# Patient Record
Sex: FEMALE | URBAN - METROPOLITAN AREA
[De-identification: names, ages, dates, MRNs, and addresses within clinical notes are randomized per-mention and may not be internally consistent; named-entity substitution may affect disease eponyms.]

---

## 2021-11-15 ENCOUNTER — APPOINTMENT (OUTPATIENT)
Dept: URBAN - METROPOLITAN AREA CLINIC 253 | Age: 46
Setting detail: DERMATOLOGY
End: 2021-11-18

## 2021-11-15 VITALS — RESPIRATION RATE: 16 BRPM | WEIGHT: 158 LBS | HEIGHT: 66 IN

## 2021-11-15 DIAGNOSIS — L20.89 OTHER ATOPIC DERMATITIS: ICD-10-CM

## 2021-11-15 PROCEDURE — OTHER ADDITIONAL NOTES: OTHER

## 2021-11-15 PROCEDURE — 99203 OFFICE O/P NEW LOW 30 MIN: CPT

## 2021-11-15 PROCEDURE — OTHER PRESCRIPTION: OTHER

## 2021-11-15 PROCEDURE — OTHER COUNSELING: OTHER

## 2021-11-15 PROCEDURE — OTHER MIPS QUALITY: OTHER

## 2021-11-15 RX ORDER — FLUOCINONIDE 0.5 MG/ML
0.05% SOLUTION TOPICAL BID
Qty: 60 | Refills: 1 | Status: ERX | COMMUNITY
Start: 2021-11-15

## 2021-11-15 RX ORDER — HYDROXYZINE HYDROCHLORIDE 25 MG/1
25 MG TABLET, FILM COATED ORAL QHS
Qty: 60 | Refills: 1 | Status: ERX | COMMUNITY
Start: 2021-11-15

## 2021-11-15 RX ORDER — FLUOCINONIDE 0.5 MG/G
0.05% OINTMENT TOPICAL BID
Qty: 30 | Refills: 1 | Status: ERX | COMMUNITY
Start: 2021-11-15

## 2021-11-15 ASSESSMENT — BSA ECZEMA: % BODY COVERED IN ECZEMA: 50

## 2021-11-15 ASSESSMENT — LOCATION ZONE DERM: LOCATION ZONE: TRUNK

## 2021-11-15 ASSESSMENT — LOCATION DETAILED DESCRIPTION DERM: LOCATION DETAILED: LEFT INFERIOR MEDIAL UPPER BACK

## 2021-11-15 ASSESSMENT — LOCATION SIMPLE DESCRIPTION DERM: LOCATION SIMPLE: LEFT UPPER BACK

## 2021-11-15 NOTE — PROCEDURE: ADDITIONAL NOTES
Render Risk Assessment In Note?: no
Detail Level: Zone
Additional Notes: Briefly discussed dupixent. Will revisit if needed at the beginning of next year when she gets insurance.

## 2021-12-09 ENCOUNTER — APPOINTMENT (OUTPATIENT)
Dept: URBAN - METROPOLITAN AREA CLINIC 253 | Age: 46
Setting detail: DERMATOLOGY
End: 2021-12-13

## 2021-12-09 VITALS — HEIGHT: 66 IN | WEIGHT: 158 LBS | RESPIRATION RATE: 14 BRPM

## 2021-12-09 DIAGNOSIS — L20.89 OTHER ATOPIC DERMATITIS: ICD-10-CM

## 2021-12-09 PROCEDURE — 99213 OFFICE O/P EST LOW 20 MIN: CPT

## 2021-12-09 PROCEDURE — OTHER PRESCRIPTION: OTHER

## 2021-12-09 PROCEDURE — OTHER COUNSELING: OTHER

## 2021-12-09 PROCEDURE — OTHER ADDITIONAL NOTES: OTHER

## 2021-12-09 PROCEDURE — OTHER MIPS QUALITY: OTHER

## 2021-12-09 RX ORDER — PREDNISONE 10 MG/1
10 MG TABLET ORAL AS DIRECTED
Qty: 45 | Refills: 0 | Status: ERX | COMMUNITY
Start: 2021-12-09

## 2021-12-09 ASSESSMENT — LOCATION SIMPLE DESCRIPTION DERM
LOCATION SIMPLE: LEFT POSTERIOR UPPER ARM
LOCATION SIMPLE: UPPER BACK
LOCATION SIMPLE: RIGHT POSTERIOR UPPER ARM

## 2021-12-09 ASSESSMENT — LOCATION DETAILED DESCRIPTION DERM
LOCATION DETAILED: INFERIOR THORACIC SPINE
LOCATION DETAILED: LEFT PROXIMAL POSTERIOR UPPER ARM
LOCATION DETAILED: RIGHT DISTAL POSTERIOR UPPER ARM

## 2021-12-09 ASSESSMENT — LOCATION ZONE DERM
LOCATION ZONE: TRUNK
LOCATION ZONE: ARM

## 2021-12-09 NOTE — PROCEDURE: ADDITIONAL NOTES
Additional Notes: Discussed Dupixent. Will wait until patient has insurance. \\nNo refills of topicals or hydroxyzine needed at this time. Pt will call if she would like refills \\nPt has had gabapentin in the past. Will start with prednisone as gabapentin isn’t a typical treatment. Advised pt call if she feels she would like gabapentin \\nDiscussed it may be hormonal. Recommended looking into a hormone pellet
Render Risk Assessment In Note?: no
Detail Level: Zone

## 2022-04-11 ENCOUNTER — APPOINTMENT (OUTPATIENT)
Age: 47
Setting detail: DERMATOLOGY
End: 2022-04-13

## 2022-04-11 VITALS — WEIGHT: 293 LBS | RESPIRATION RATE: 14 BRPM | HEIGHT: 66 IN

## 2022-04-11 DIAGNOSIS — L20.89 OTHER ATOPIC DERMATITIS: ICD-10-CM

## 2022-04-11 PROBLEM — L20.84 INTRINSIC (ALLERGIC) ECZEMA: Status: ACTIVE | Noted: 2022-04-11

## 2022-04-11 PROCEDURE — OTHER COUNSELING: OTHER

## 2022-04-11 PROCEDURE — OTHER ADDITIONAL NOTES: OTHER

## 2022-04-11 PROCEDURE — OTHER PRESCRIPTION: OTHER

## 2022-04-11 PROCEDURE — OTHER MIPS QUALITY: OTHER

## 2022-04-11 PROCEDURE — 99213 OFFICE O/P EST LOW 20 MIN: CPT | Mod: 95

## 2022-04-11 RX ORDER — RUXOLITINIB 15 MG/G
1.5% CREAM TOPICAL BID
Qty: 60 | Refills: 3 | Status: ERX | COMMUNITY
Start: 2022-04-11

## 2022-04-11 RX ORDER — CLOBETASOL PROPIONATE 0.5 MG/G
0.05% CREAM TOPICAL BID
Qty: 60 | Refills: 3 | Status: ERX | COMMUNITY
Start: 2022-04-11

## 2022-04-11 RX ORDER — PREDNISONE 10 MG/1
10 MG TABLET ORAL AS DIRECTED
Qty: 45 | Refills: 0 | Status: ERX

## 2022-04-11 ASSESSMENT — LOCATION DETAILED DESCRIPTION DERM
LOCATION DETAILED: SUPERIOR THORACIC SPINE
LOCATION DETAILED: RIGHT SUPERIOR ANTERIOR NECK
LOCATION DETAILED: RIGHT PROXIMAL POSTERIOR UPPER ARM
LOCATION DETAILED: LEFT PROXIMAL POSTERIOR UPPER ARM

## 2022-04-11 ASSESSMENT — LOCATION SIMPLE DESCRIPTION DERM
LOCATION SIMPLE: LEFT POSTERIOR UPPER ARM
LOCATION SIMPLE: RIGHT POSTERIOR UPPER ARM
LOCATION SIMPLE: RIGHT ANTERIOR NECK
LOCATION SIMPLE: UPPER BACK

## 2022-04-11 ASSESSMENT — LOCATION ZONE DERM
LOCATION ZONE: ARM
LOCATION ZONE: NECK
LOCATION ZONE: TRUNK

## 2022-04-11 ASSESSMENT — BSA RASH: BSA RASH: 5

## 2022-12-27 NOTE — PROCEDURE: ADDITIONAL NOTES
Render Risk Assessment In Note?: no
Detail Level: Zone
Additional Notes: Discussed dupixent\\nDiscussed doing another taper of prednisone. Pt was unable to finish last prescription due to getting covid\\n\\nThe patient provided consent to utilize [email or web address] video chat for the telehealth visit via our electronic devices and is aware that this visit will not be recorded. Our  will serve as our witness. This was done due to COVID-19 pandemic. She was out home and I was at my office during this visit. I spent 15 minutes on this encounter.
DISCHARGE

## 2023-04-12 ENCOUNTER — APPOINTMENT (OUTPATIENT)
Dept: URBAN - METROPOLITAN AREA CLINIC 253 | Age: 48
Setting detail: DERMATOLOGY
End: 2023-04-14

## 2023-04-12 VITALS — HEIGHT: 66 IN | RESPIRATION RATE: 14 BRPM | WEIGHT: 155 LBS

## 2023-04-12 DIAGNOSIS — L20.89 OTHER ATOPIC DERMATITIS: ICD-10-CM

## 2023-04-12 PROBLEM — L20.84 INTRINSIC (ALLERGIC) ECZEMA: Status: ACTIVE | Noted: 2023-04-12

## 2023-04-12 PROCEDURE — OTHER COUNSELING: OTHER

## 2023-04-12 PROCEDURE — 99213 OFFICE O/P EST LOW 20 MIN: CPT

## 2023-04-12 PROCEDURE — OTHER PRESCRIPTION: OTHER

## 2023-04-12 PROCEDURE — OTHER MIPS QUALITY: OTHER

## 2023-04-12 PROCEDURE — OTHER ADDITIONAL NOTES: OTHER

## 2023-04-12 RX ORDER — CLOBETASOL PROPIONATE 0.5 MG/G
0.05% CREAM TOPICAL BID
Qty: 60 | Refills: 3 | Status: ERX

## 2023-04-12 RX ORDER — RUXOLITINIB 15 MG/G
1.5% CREAM TOPICAL BID
Qty: 60 | Refills: 3 | Status: ERX

## 2023-04-12 ASSESSMENT — LOCATION ZONE DERM
LOCATION ZONE: ARM
LOCATION ZONE: TRUNK
LOCATION ZONE: NECK

## 2023-04-12 ASSESSMENT — LOCATION DETAILED DESCRIPTION DERM
LOCATION DETAILED: RIGHT PROXIMAL POSTERIOR UPPER ARM
LOCATION DETAILED: LEFT PROXIMAL POSTERIOR UPPER ARM
LOCATION DETAILED: RIGHT SUPERIOR ANTERIOR NECK
LOCATION DETAILED: SUPERIOR THORACIC SPINE

## 2023-04-12 ASSESSMENT — LOCATION SIMPLE DESCRIPTION DERM
LOCATION SIMPLE: RIGHT POSTERIOR UPPER ARM
LOCATION SIMPLE: UPPER BACK
LOCATION SIMPLE: RIGHT ANTERIOR NECK
LOCATION SIMPLE: LEFT POSTERIOR UPPER ARM

## 2023-04-12 NOTE — PROCEDURE: ADDITIONAL NOTES
Detail Level: Zone
Render Risk Assessment In Note?: no
Additional Notes: We briefly discussed Dupixent though she has been managing well with topicals, we will refill today.